# Patient Record
Sex: FEMALE | Race: WHITE | Employment: OTHER | URBAN - METROPOLITAN AREA
[De-identification: names, ages, dates, MRNs, and addresses within clinical notes are randomized per-mention and may not be internally consistent; named-entity substitution may affect disease eponyms.]

---

## 2017-02-01 ENCOUNTER — HOSPITAL ENCOUNTER (OUTPATIENT)
Dept: MAMMOGRAPHY | Age: 62
Discharge: HOME | End: 2017-02-01
Attending: ORTHOPAEDIC SURGERY
Payer: COMMERCIAL

## 2017-02-01 DIAGNOSIS — Z12.31 ENCOUNTER FOR SCREENING MAMMOGRAM FOR MALIGNANT NEOPLASM OF BREAST: ICD-10-CM

## 2017-02-01 PROCEDURE — 77067 SCR MAMMO BI INCL CAD: CPT

## 2018-02-07 PROCEDURE — 87624 HPV HI-RISK TYP POOLED RSLT: CPT | Performed by: OBSTETRICS & GYNECOLOGY

## 2018-02-07 PROCEDURE — 88175 CYTOPATH C/V AUTO FLUID REDO: CPT | Performed by: OBSTETRICS & GYNECOLOGY

## 2018-02-20 ENCOUNTER — HOSPITAL ENCOUNTER (OUTPATIENT)
Dept: MAMMOGRAPHY | Age: 63
Discharge: HOME | End: 2018-02-20
Attending: OBSTETRICS & GYNECOLOGY
Payer: COMMERCIAL

## 2018-02-20 DIAGNOSIS — Z12.39 SPECIAL SCREENING EXAMINATION FOR NEOPLASM OF BREAST: ICD-10-CM

## 2018-02-20 PROCEDURE — 77067 SCR MAMMO BI INCL CAD: CPT | Performed by: OBSTETRICS & GYNECOLOGY

## 2019-02-26 ENCOUNTER — HOSPITAL ENCOUNTER (OUTPATIENT)
Dept: MAMMOGRAPHY | Age: 64
Discharge: HOME | End: 2019-02-26
Attending: INTERNAL MEDICINE
Payer: COMMERCIAL

## 2019-02-26 DIAGNOSIS — Z12.39 ENCOUNTER FOR SCREENING FOR MALIGNANT NEOPLASM OF BREAST: ICD-10-CM

## 2019-02-26 PROCEDURE — 77067 SCR MAMMO BI INCL CAD: CPT | Performed by: INTERNAL MEDICINE

## 2020-03-03 ENCOUNTER — HOSPITAL ENCOUNTER (OUTPATIENT)
Dept: MAMMOGRAPHY | Age: 65
Discharge: HOME | End: 2020-03-03
Attending: OBSTETRICS & GYNECOLOGY
Payer: COMMERCIAL

## 2020-03-03 DIAGNOSIS — Z12.31 ENCOUNTER FOR SCREENING MAMMOGRAM FOR MALIGNANT NEOPLASM OF BREAST: ICD-10-CM

## 2020-03-03 PROCEDURE — 77063 BREAST TOMOSYNTHESIS BI: CPT | Performed by: OBSTETRICS & GYNECOLOGY

## 2020-03-03 PROCEDURE — 77067 SCR MAMMO BI INCL CAD: CPT | Performed by: OBSTETRICS & GYNECOLOGY

## 2021-04-27 ENCOUNTER — HOSPITAL ENCOUNTER (OUTPATIENT)
Dept: MAMMOGRAPHY | Age: 66
Discharge: HOME | End: 2021-04-27
Attending: INTERNAL MEDICINE
Payer: MEDICARE

## 2021-04-27 DIAGNOSIS — Z12.31 ENCOUNTER FOR SCREENING MAMMOGRAM FOR MALIGNANT NEOPLASM OF BREAST: ICD-10-CM

## 2021-04-27 PROCEDURE — 77067 SCR MAMMO BI INCL CAD: CPT | Performed by: INTERNAL MEDICINE

## 2021-04-27 PROCEDURE — 77063 BREAST TOMOSYNTHESIS BI: CPT | Performed by: INTERNAL MEDICINE

## 2025-01-19 ENCOUNTER — APPOINTMENT (OUTPATIENT)
Dept: CT IMAGING | Age: 70
End: 2025-01-19
Attending: EMERGENCY MEDICINE
Payer: MEDICARE

## 2025-01-19 ENCOUNTER — HOSPITAL ENCOUNTER (EMERGENCY)
Age: 70
Discharge: HOME | End: 2025-01-19
Attending: EMERGENCY MEDICINE
Payer: MEDICARE

## 2025-01-19 VITALS
SYSTOLIC BLOOD PRESSURE: 145 MMHG | OXYGEN SATURATION: 97 % | BODY MASS INDEX: 22.08 KG/M2 | TEMPERATURE: 99 F | RESPIRATION RATE: 17 BRPM | WEIGHT: 120 LBS | HEART RATE: 87 BPM | DIASTOLIC BLOOD PRESSURE: 56 MMHG | HEIGHT: 62 IN

## 2025-01-19 DIAGNOSIS — S00.03XA HEMATOMA OF SCALP, INITIAL ENCOUNTER: ICD-10-CM

## 2025-01-19 DIAGNOSIS — S09.90XA CLOSED HEAD INJURY, INITIAL ENCOUNTER: Primary | ICD-10-CM

## 2025-01-19 PROCEDURE — 72125 CT NECK SPINE W/O DYE: CPT | Performed by: EMERGENCY MEDICINE

## 2025-01-19 PROCEDURE — 70450 CT HEAD/BRAIN W/O DYE: CPT | Performed by: EMERGENCY MEDICINE

## 2025-01-19 PROCEDURE — 99284 EMERGENCY DEPT VISIT MOD MDM: CPT

## 2025-01-19 RX ORDER — ACETAMINOPHEN 500 MG
1000 TABLET ORAL ONCE
Status: COMPLETED | OUTPATIENT
Start: 2025-01-19 | End: 2025-01-19

## 2025-01-20 NOTE — ED PROVIDER NOTES
Patient Seen in: [facility] [place]      History     Chief Complaint   Patient presents with    Trauma     Pt resides at a skilled nursing facility ([facility]), was walking back from the bathroom when she lost her footing and fell to the ground. Pt struck the back of her head, denies LOC/vomiting, AAOx4 in triage. Pt not on AC. 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  Patient accompanied by her son.  Patient states that she was reaching for her walker when she lost her footing and fell backwards hitting her head.  Denies any LOC.  Nurse was located near patient unable to help patient up from fall.  Patient was able to ambulate after this.  No preceding symptoms.  No chest pain, shortness of breath, headache, vomiting, focal weakness or numbness.  Son notes patient does have history of frequent falls.  She was recently admitted at [facility] for COVID and was discharged to SNF where she is currently residing.     Objective:     Past Medical History:    Atrial fibrillation (HCC)    CANCER    CML- had transplant done- followed at [facility]    Chronic myelogenous leukemia (HCC)    Remission 2+ years    Depression    HOSPITALIZATIONS    pneumonia- intubated- [facility]    Hyperlipidemia    Osteoporosis              Past Surgical History:   Procedure Laterality Date    Benign biopsy right            Colonoscopy      At [facility]- normal    Christina localization wire 1 site right (cpt=19281)      AGE 13 BENIGN    Oophorectomy Left     ovary and Fallopian tube removed    Other surgical history      Ring in skull for chemo    Removal of ovarian cyst(s) Left 2016    Left Salpingectomy, Oopherectomy.                 Social History     Socioeconomic History    Marital status:    Tobacco Use    Smoking status: Never    Smokeless tobacco: Never   Vaping Use    Vaping status: Never Used   Substance and Sexual Activity    Alcohol use: Yes     Comment: Socially    Drug use: No    Sexual activity: Yes     Partners: Male   Social History Narrative    ** Merged History Encounter **                       Physical Exam     ED Triage Vitals [01/19/25 1908]   /65   Pulse 88   Resp 16   Temp 99.1 °F (37.3 °C)   Temp src Temporal   SpO2 96 %   O2 Device None (Room air)       Current Vitals:   Vital Signs  BP: 145/56  Pulse: 87  Resp: 17  Temp: 99.1 °F (37.3 °C)  Temp src: Temporal    Oxygen Therapy  SpO2: 97 %  O2 Device: None (Room air)        Physical Exam  Vitals and nursing note reviewed.   Constitutional:       General: She is not in acute distress.     Appearance: She is not ill-appearing.   HENT:      Head: Normocephalic.      Comments: L posterior occipital scalp hematoma     Mouth/Throat:      Mouth: Mucous membranes are moist.   Eyes:      Extraocular Movements: Extraocular movements intact.      Pupils: Pupils are equal, round, and reactive to light.   Cardiovascular:      Rate and Rhythm: Normal rate and regular rhythm.   Pulmonary:      Effort: Pulmonary effort is normal.   Abdominal:      General: There is no distension.      Palpations: Abdomen is soft.      Tenderness: There is no abdominal tenderness.   Musculoskeletal:      Cervical back: Neck supple.      Right lower leg: No edema.      Left lower leg: No edema.      Comments: No midline tenderness to palpation of spine.  No extremity tenderness or deformities.  Distally neurovascular intact.   Skin:     General: Skin is warm and dry.      Capillary Refill: Capillary refill takes less than 2 seconds.   Neurological:      General: No focal deficit present.      Mental Status: She is alert.      Comments: 5/5 strength in UE and LE  bilaterally  Normal sensation  CN II-XII in tact  No pronator drift       Psychiatric:         Mood and Affect: Mood normal.             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 Pt lost her balance reaching for walker and hit head. No LOC. Pt able to ambulate after. Reports mild neck ache.     CT head independently reviewed which shows  L scalp hematoma, no acute intracranial hemorrhage.  CT brain shows ventricular catheter, possible evidence of NPH.  CT of the whole spine negative for acute process.  I discussed with patient's son and patient at bedside.  Son reports that patient has history of similar findings of possibly NPH in the past on CT brain.  She is follow-up with neurology who do not suspect a that patient has NPH and-pt has had workup for this in the past.  There are no acute findings on imaging from fall.  Patient has been able to ambulate since fall.  I discussed return precautions and recommended follow-up with PCP.  Patient is on both verbalized understanding and agreement plan.        Medical Decision Making  Amount and/or Complexity of Data Reviewed  External Data Reviewed: notes.  Radiology: ordered and independent interpretation performed. Decision-making details documented in ED Course.        Disposition and Plan     Clinical Impression:  1. Closed head injury, initial encounter    2. Hematoma of scalp, initial encounter         Disposition:  Discharge  1/19/2025  8:08 pm    Follow-up:  Geovani Mittal MD  [address] [place] [id number] [phone]    Schedule an appointment as soon as possible for a visit in 1 day(s)            Medications Prescribed:  Discharge Medication List as of 1/19/2025  8:33 PM               Supplementary Documentation:

## 2025-01-20 NOTE — ED INITIAL ASSESSMENT (HPI)
Pt resides at a skilled nursing facility ([facility]), was walking back from the bathroom when she lost her footing and fell to the ground. Pt struck the back of her head, denies LOC/vomiting, AAOx4 in triage. Pt not on AC. L occipital hematoma noted upon arrival

## 2025-01-20 NOTE — DISCHARGE INSTRUCTIONS
You were seen emergency department after a fall.  Your CT scan showed no obvious injuries.  Return to the ER if you fall been, he does have a headache, vomiting, new weakness or numbness, or any other new concerns or worsening symptoms.  Please follow-up closely with your primary care physician.